# Patient Record
Sex: MALE
[De-identification: names, ages, dates, MRNs, and addresses within clinical notes are randomized per-mention and may not be internally consistent; named-entity substitution may affect disease eponyms.]

---

## 2022-10-31 ENCOUNTER — NURSE TRIAGE (OUTPATIENT)
Dept: OTHER | Facility: CLINIC | Age: 57
End: 2022-10-31

## 2022-10-31 NOTE — TELEPHONE ENCOUNTER
Location of patient: [place]    Subjective: Caller states \"extreme pain rt side 4 inch belly button. Feels like kicked like a horse. \"     Began 2 hours ago. Pass gas no relief. 97% / HR 84    Has happened before, 5-6 weeks ago. Lasted about 30 minutes, resolved. MD aware. Current Symptoms: abdominal pain  sharp    Associated Symptoms: NA    Pain Severity: 5/10; sharp; constant - severity depends on position. Temperature: 99.0     What has been tried: Nothing    Recommended disposition: See provider in the next 3-4 hours or secondary provider triage. Care advice provided, patient verbalizes understanding; denies any other questions or concerns. Outcome: Patient/caller agrees to follow-up with PCP   pt agrees to page on call provider, otherwise recommended to  take Tylenol and if symptoms do not improve proceed to Emergency Room in the next 2-3 hours. .    Reason for Disposition   [1] MILD-MODERATE pain AND [2] constant AND [3] present > 2 hours    Protocols used: Abdominal Pain - Male-ADULT-